# Patient Record
(demographics unavailable — no encounter records)

---

## 2024-11-26 NOTE — PHYSICAL EXAM
[de-identified] : Constitutional: 28 year old male, alert and oriented, cooperative, in no acute distress.  HEENT  NC/AT.  Appearance: symmetric  Neck/Back Straight without deformity or instability.  Good ROM.  Chest/Respiratory  Respiratory effort: no intercostal retractions or use of accessory muscles. Nonlabored Breathing  Skin  On inspection, warm and dry without rashes or lesions.  Mental Status:  Judgment, insight: intact Orientation: oriented to time, place, and person  Neurological: Sensory and Motor are grossly intact throughout  Right Tibia/Leg  Inspection:     Incisions well healing. No erythema or drainage     No Effusion     No calf tenderness or swelling. Compartments soft, nondistended  Range of Motion: 	Extension - 0 degrees 	Flexion - 120 degrees 	Extensor lag: None  Neurologic Exam     Motor intact including 5/5 Extensor Hallucis Longus, 5/5 Flexor Hallucis Longus, 5/5 Tibialis Anterior and 5/5 Gastrocnemius     Sensation Intact to Light Touch including Saphenous, Sural, Superficial Peroneal, Deep Peroneal, Tibial nerve distributions  Vascular Exam     Foot is warm and well perfused with 2+ Dorsalis Pedis Pulse   No pain with range of motion of the bilateral hips or left knee. No lumbar paraspinal muscle tenderness. [de-identified] : XRay:  XRays of the Right Tibia/Fibula (2 Views) taken in the office today and discussed with the patient. XRays demonstrate a right tibia intramedullary nail. There has been no change in fracture alignment. There is no evidence of hardware failure or loosening. There has been progression of fracture healing. (my personal interpretation).

## 2024-11-26 NOTE — DISCUSSION/SUMMARY
[de-identified] : Yong Posada is a 28-year-old male who presents to the office for follow up of his right tibia IMN.  Patient underwent surgery on 5/5/2024.  X-rays showed right tibial IMN without any obvious hardware loosening or failure.  Examination showed the compartments were soft and compressible.  Patient was neurovascularly intact.  Discussed with patient the examination and imaging findings.  Discussed the recovery from right tibia IMN, including home exercises.  Patient will continue his home exercises. He will remain weightbearing as tolerated on the right lower extremity. Patient will follow-up in 6 months for reevaluation and management.  Patient understanding and in agreement with the plan.  All questions answered.  Plan: -Home Exercises -Right lower extremity: Weightbearing as tolerated -Follow-up in 6 months for reevaluation and management
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
## 2025-05-27 NOTE — PHYSICAL EXAM
[de-identified] : Constitutional: 29 year old male, alert and oriented, cooperative, in no acute distress.  HEENT  NC/AT.  Appearance: symmetric  Neck/Back Straight without deformity or instability.  Good ROM.  Chest/Respiratory  Respiratory effort: no intercostal retractions or use of accessory muscles. Nonlabored Breathing  Skin  On inspection, warm and dry without rashes or lesions.  Mental Status:  Judgment, insight: intact Orientation: oriented to time, place, and person  Neurological: Sensory and Motor are grossly intact throughout  Right Tibia/Leg  Inspection:     Incisions well healing. No erythema or drainage     No Effusion     No calf tenderness or swelling. Compartments soft, nondistended  Range of Motion: 	Extension - 0 degrees 	Flexion - 120 degrees 	Extensor lag: None  Neurologic Exam     Motor intact including 5/5 Extensor Hallucis Longus, 5/5 Flexor Hallucis Longus, 5/5 Tibialis Anterior and 5/5 Gastrocnemius     Sensation Intact to Light Touch including Saphenous, Sural, Superficial Peroneal, Deep Peroneal, Tibial nerve distributions [de-identified] : XRay:  XRays of the Right Tibia/Fibula (2 Views) taken in the office today and discussed with the patient. XRays demonstrate a right tibia intramedullary nail. There has been no change in fracture alignment. There is no evidence of hardware failure or loosening. The fracture is well healed. (my personal interpretation).

## 2025-05-27 NOTE — DISCUSSION/SUMMARY
[de-identified] : Yong Posada is a 29-year-old male who presents to the office for follow up of his right tibia IMN.  Patient underwent surgery on 5/5/2024.  X-rays showed right tibial IMN without any obvious hardware loosening or failure.  Examination showed the compartments were soft and compressible.  Patient was neurovascularly intact.  Discussed with patient the examination and imaging findings.  Discussed the recovery from right tibia IMN, including home exercises.  Patient will continue his home exercises. He will remain weightbearing as tolerated on the right lower extremity. Patient will follow-up in 1 year for reevaluation and management.  Patient understanding and in agreement with the plan.  All questions answered.  Plan: -Home Exercises -Right lower extremity: Weightbearing as tolerated -Follow-up in 1 year for reevaluation and management

## 2025-05-27 NOTE — PHYSICAL EXAM
[de-identified] : Constitutional: 29 year old male, alert and oriented, cooperative, in no acute distress.  HEENT  NC/AT.  Appearance: symmetric  Neck/Back Straight without deformity or instability.  Good ROM.  Chest/Respiratory  Respiratory effort: no intercostal retractions or use of accessory muscles. Nonlabored Breathing  Skin  On inspection, warm and dry without rashes or lesions.  Mental Status:  Judgment, insight: intact Orientation: oriented to time, place, and person  Neurological: Sensory and Motor are grossly intact throughout  Right Tibia/Leg  Inspection:     Incisions well healing. No erythema or drainage     No Effusion     No calf tenderness or swelling. Compartments soft, nondistended  Range of Motion: 	Extension - 0 degrees 	Flexion - 120 degrees 	Extensor lag: None  Neurologic Exam     Motor intact including 5/5 Extensor Hallucis Longus, 5/5 Flexor Hallucis Longus, 5/5 Tibialis Anterior and 5/5 Gastrocnemius     Sensation Intact to Light Touch including Saphenous, Sural, Superficial Peroneal, Deep Peroneal, Tibial nerve distributions [de-identified] : XRay:  XRays of the Right Tibia/Fibula (2 Views) taken in the office today and discussed with the patient. XRays demonstrate a right tibia intramedullary nail. There has been no change in fracture alignment. There is no evidence of hardware failure or loosening. The fracture is well healed. (my personal interpretation).

## 2025-05-27 NOTE — DISCUSSION/SUMMARY
[de-identified] : Yong Posada is a 29-year-old male who presents to the office for follow up of his right tibia IMN.  Patient underwent surgery on 5/5/2024.  X-rays showed right tibial IMN without any obvious hardware loosening or failure.  Examination showed the compartments were soft and compressible.  Patient was neurovascularly intact.  Discussed with patient the examination and imaging findings.  Discussed the recovery from right tibia IMN, including home exercises.  Patient will continue his home exercises. He will remain weightbearing as tolerated on the right lower extremity. Patient will follow-up in 1 year for reevaluation and management.  Patient understanding and in agreement with the plan.  All questions answered.  Plan: -Home Exercises -Right lower extremity: Weightbearing as tolerated -Follow-up in 1 year for reevaluation and management